# Patient Record
Sex: MALE | Race: WHITE
[De-identification: names, ages, dates, MRNs, and addresses within clinical notes are randomized per-mention and may not be internally consistent; named-entity substitution may affect disease eponyms.]

---

## 2018-08-20 ENCOUNTER — HOSPITAL ENCOUNTER (EMERGENCY)
Dept: HOSPITAL 77 - KA.ED | Age: 48
Discharge: HOME | End: 2018-08-20
Payer: COMMERCIAL

## 2018-08-20 DIAGNOSIS — S51.011A: Primary | ICD-10-CM
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
## 2018-08-20 NOTE — EDM.PDOC
ED HPI GENERAL MEDICAL PROBLEM





- General


Chief Complaint: Upper Extremity Injury/Pain


Time Seen by Provider: 08/20/18 14:32


Source of Information: Reports: Patient


History Limitations: Reports: No Limitations





- History of Present Illness


INITIAL COMMENTS - FREE TEXT/NARRATIVE: 





Patient presents with right elbow and right shoulder pain after falling/

tripping at work.  He landed on his right elbow on a sheet metal surface.  

There is a small cut on the posterior elbow that is bleeding.  He denies any LOC

, neck pain, vision change, vomiting and says he didn't hit his head at all.  

He thinks last tetanus was "couple" years ago at [facility] which we will 

attempt to verify.  He denies any heart, lung, kidney problems or any chronic 

medical problems.


  ** Right Elbow


Pain Score (Numeric/FACES): 6





- Related Data


 Allergies











Allergy/AdvReac Type Severity Reaction Status Date / Time


 


No Known Allergies Allergy   Verified 08/20/18 14:08














Past Medical History





- Past Health History


Medical/Surgical History: Denies Medical/Surgical History





- Past Surgical History


GI Surgical History: Reports: Appendectomy





Social & Family History





- Tobacco Use


Smoking Status *Q: Never Smoker





- Recreational Drug Use


Recreational Drug Use: No





Review of Systems





- Review of Systems


Review Of Systems: See Below


Constitutional: Denies: Chills, Fever, Weakness


Eyes: Denies: Vision Change


Ears: Denies: Dizziness, Pain


Nose: Reports: No Symptoms


Mouth/Throat: Reports: No Symptoms


Respiratory: Reports: No Symptoms.  Denies: Shortness of Breath


Cardiovascular: Reports: No Symptoms.  Denies: Chest Pain, Lightheadedness, 

Syncope


GI/Abdominal: Reports: No Symptoms.  Denies: Nausea, Vomiting


Genitourinary: Reports: No Symptoms


Musculoskeletal: Reports: No Symptoms, Shoulder Pain.  Denies: Neck Pain, Back 

Pain, Hand Pain


Skin: Reports: Wound (right elbow).  Denies: Cyanosis, Jaundice, Mottled, Pallor

, Diaphoresis


Neurological: Reports: No Symptoms.  Denies: Confusion, Dizziness, Seizure, 

Syncope, Trouble Speaking, Difficulty Walking


Psychiatric: Denies: Confusion





ED EXAM, GENERAL





- Physical Exam


Exam: See Below


Exam Limited By: No Limitations


General Appearance: Alert, WD/WN, No Apparent Distress


Eye Exam: Bilateral Eye: EOMI, Normal Inspection, PERRL


Ears: Normal External Exam, Hearing Grossly Normal


Nose: Normal Inspection, No Blood


Throat/Mouth: Normal Inspection, Normal Lips, Normal Voice, No Airway Compromise


Head: Atraumatic, Normocephalic


Neck: Normal Inspection, Supple, Non-Tender, Full Range of Motion.  No: Tender 

Lateral, Tender Midline


Respiratory/Chest: No Respiratory Distress, Lungs Clear, Normal Breath Sounds, 

No Accessory Muscle Use


Cardiovascular: Regular Rate, Rhythm, No Murmur


GI/Abdominal: No Distention


Back Exam: Normal Inspection, Full Range of Motion


Extremities: Normal Range of Motion, Normal Capillary Refill, Other (There is a 

1 cm laceration of right posterior elbow along with significant hematoma.  ROM 

of both elbow and shoulder are normal with minimal pain.  No pain to palpation.)


Neurological: Alert, Oriented, Normal Cognition, No Motor/Sensory Deficits


Psychiatric: Normal Affect, Normal Mood


Skin Exam: Warm, Dry, Normal Color, No Rash





ED TRAUMA EXTREMITY PROCEDURES





- Laceration/Wound Repair


  ** Right Posterior Elbow


Lac/Wound Length In cm: 1.5


Appearance: Subcutaneous, Linear


Distal NVT: Neuro & Vascular Intact, No Tendon Injury


Anesthetic Type: Local


Local Anesthesia - Lidocaine (Xylocaine): 2% with EPI


Local Anesthetic Volume: 3cc


Skin Prep: Chlorhexidine (Hibiciens), Saline


Saline Irrigation (cc's): 60


Exploration/Debridement/Repair: Wound Explored


Closed With: Sutures


Suture Size: 4-0


Suture Type: Nylon, Interrupted, Simple


Drain Placement: No


Sterile Dressing Applied: Nurse


Tetanus Status Addressed: Yes


Complications: No





Course





- Vital Signs


Last Recorded V/S: 





 Last Vital Signs











Temp  97.6 F   08/20/18 14:09


 


Pulse  82   08/20/18 14:09


 


Resp  16   08/20/18 14:09


 


BP  154/80 H  08/20/18 14:09


 


Pulse Ox  95   08/20/18 14:09














- Orders/Labs/Meds


Orders: 





 Active Orders 24 hr











 Category Date Time Status


 


 Elbow Min 3V Rt [CR] Stat Exams  08/20/18 14:40 Ordered


 


 Shoulder Comp Rt [CR] Stat Exams  08/20/18 14:40 Ordered














- Re-Assessments/Exams


Free Text/Narrative Re-Assessment/Exam: 





08/20/18 14:47


Last tetanus was confirmed in 2015.


08/20/18 15:53


Xray shows no evidence of fracture or acute bony pathology.  Xray report 

confirms.  Discussed findings and treatment plan with patient.  The laceration 

was repaired as described.  Patient discharged to home in stable condition.





Departure





- Departure


Time of Disposition: 15:48


Disposition: Home, Self-Care 01


Condition: Good


Clinical Impression: 


Laceration of elbow without complication


Qualifiers:


 Encounter type: initial encounter Laterality: right Qualified Code(s): 

S51.011A - Laceration without foreign body of right elbow, initial encounter





Contusion of elbow, right


Qualifiers:


 Encounter type: initial encounter Qualified Code(s): S50.01XA - Contusion of 

right elbow, initial encounter








- Discharge Information


Instructions:  Laceration Care, Adult


Referrals: 


PCP,None [Primary Care Provider] - 


Additional Instructions: 


1. Keep wound clean and dry and keep covered while at work.  You should change 

the bandages daily.


2. Watch for signs of infection and get rechecked ASAP if present.


3. Follow up with Primary Care in ten days for suture removal and to finalize 

the work comp restrictions and paperwork.





- My Orders


Last 24 Hours: 





My Active Orders





08/20/18 14:40


Elbow Min 3V Rt [CR] Stat 


Shoulder Comp Rt [CR] Stat 














- Assessment/Plan


Last 24 Hours: 





My Active Orders





08/20/18 14:40


Elbow Min 3V Rt [CR] Stat 


Shoulder Comp Rt [CR] Stat